# Patient Record
Sex: FEMALE | Race: WHITE | Employment: PART TIME | URBAN - METROPOLITAN AREA
[De-identification: names, ages, dates, MRNs, and addresses within clinical notes are randomized per-mention and may not be internally consistent; named-entity substitution may affect disease eponyms.]

---

## 2017-01-19 PROBLEM — K21.9 GASTROESOPHAGEAL REFLUX DISEASE: Status: ACTIVE | Noted: 2017-01-19

## 2017-06-12 PROBLEM — F33.41 RECURRENT MAJOR DEPRESSIVE DISORDER, IN PARTIAL REMISSION (HCC): Status: ACTIVE | Noted: 2017-06-12

## 2017-06-12 PROBLEM — Z79.890 POSTMENOPAUSAL HRT (HORMONE REPLACEMENT THERAPY): Status: ACTIVE | Noted: 2017-06-12

## 2017-06-23 PROBLEM — M54.42 LOW BACK PAIN WITH LEFT-SIDED SCIATICA: Status: ACTIVE | Noted: 2017-06-23

## 2018-01-18 PROBLEM — G57.12 MERALGIA PARAESTHETICA, LEFT: Status: ACTIVE | Noted: 2017-12-20

## 2018-01-18 PROBLEM — M54.42 LOW BACK PAIN WITH LEFT-SIDED SCIATICA: Status: RESOLVED | Noted: 2017-06-23 | Resolved: 2018-01-18

## 2018-01-18 PROBLEM — R42 DIZZINESS: Status: ACTIVE | Noted: 2018-01-18

## 2018-01-18 PROBLEM — E55.9 VITAMIN D DEFICIENCY: Status: ACTIVE | Noted: 2018-01-18

## 2018-06-26 PROBLEM — R07.9 CHEST PAIN WITH HIGH RISK OF ACUTE CORONARY SYNDROME: Status: ACTIVE | Noted: 2018-06-26

## 2018-06-28 PROBLEM — F41.9 ANXIETY: Status: ACTIVE | Noted: 2018-06-28

## 2018-07-11 PROBLEM — G43.109 COMPLICATED MIGRAINE: Status: ACTIVE | Noted: 2018-07-11

## 2018-11-02 PROBLEM — M54.50 CHRONIC MIDLINE LOW BACK PAIN WITHOUT SCIATICA: Status: ACTIVE | Noted: 2018-11-02

## 2018-11-02 PROBLEM — Z90.721 S/P HYSTERECTOMY WITH OOPHORECTOMY: Status: ACTIVE | Noted: 2018-10-05

## 2018-11-02 PROBLEM — Z23 NEED FOR INFLUENZA VACCINATION: Status: ACTIVE | Noted: 2018-11-02

## 2018-11-02 PROBLEM — Z90.710 S/P HYSTERECTOMY WITH OOPHORECTOMY: Status: ACTIVE | Noted: 2018-10-05

## 2018-11-02 PROBLEM — Z23 NEED FOR PROPHYLACTIC VACCINATION AGAINST STREPTOCOCCUS PNEUMONIAE (PNEUMOCOCCUS): Status: ACTIVE | Noted: 2018-11-02

## 2018-11-02 PROBLEM — G89.29 CHRONIC MIDLINE LOW BACK PAIN WITHOUT SCIATICA: Status: ACTIVE | Noted: 2018-11-02

## 2018-12-02 PROBLEM — Z23 NEED FOR INFLUENZA VACCINATION: Status: RESOLVED | Noted: 2018-11-02 | Resolved: 2018-12-02

## 2020-11-03 PROBLEM — R07.9 CHEST PAIN: Status: RESOLVED | Noted: 2020-11-03 | Resolved: 2020-11-03

## 2021-07-25 PROBLEM — M70.62 TROCHANTERIC BURSITIS OF BOTH HIPS: Status: ACTIVE | Noted: 2021-07-25

## 2021-07-25 PROBLEM — S76.011A: Status: ACTIVE | Noted: 2021-07-25

## 2021-08-17 PROBLEM — R10.31 RIGHT GROIN PAIN: Status: ACTIVE | Noted: 2021-08-17

## 2023-03-08 ENCOUNTER — OFFICE VISIT (OUTPATIENT)
Dept: NEUROLOGY | Age: 53
End: 2023-03-08
Payer: COMMERCIAL

## 2023-03-08 VITALS
HEIGHT: 66 IN | SYSTOLIC BLOOD PRESSURE: 120 MMHG | BODY MASS INDEX: 31.71 KG/M2 | HEART RATE: 72 BPM | DIASTOLIC BLOOD PRESSURE: 70 MMHG | OXYGEN SATURATION: 98 % | WEIGHT: 197.3 LBS

## 2023-03-08 DIAGNOSIS — G57.12 MERALGIA PARESTHETICA OF LEFT SIDE: Primary | ICD-10-CM

## 2023-03-08 PROCEDURE — 1036F TOBACCO NON-USER: CPT | Performed by: PSYCHIATRY & NEUROLOGY

## 2023-03-08 PROCEDURE — G8484 FLU IMMUNIZE NO ADMIN: HCPCS | Performed by: PSYCHIATRY & NEUROLOGY

## 2023-03-08 PROCEDURE — 3017F COLORECTAL CA SCREEN DOC REV: CPT | Performed by: PSYCHIATRY & NEUROLOGY

## 2023-03-08 PROCEDURE — G8427 DOCREV CUR MEDS BY ELIG CLIN: HCPCS | Performed by: PSYCHIATRY & NEUROLOGY

## 2023-03-08 PROCEDURE — 99204 OFFICE O/P NEW MOD 45 MIN: CPT | Performed by: PSYCHIATRY & NEUROLOGY

## 2023-03-08 PROCEDURE — G8417 CALC BMI ABV UP PARAM F/U: HCPCS | Performed by: PSYCHIATRY & NEUROLOGY

## 2023-03-08 NOTE — PROGRESS NOTES
[birth year]    Gloria Yanez  02/3/7286    Chief Complaint   Patient presents with    Peripheral Neuropathy     Pt presents for neuropathic pain, pt states it is present in her left thigh        History of Present Illness  Mahesh Abreu is a 47 y.o. female presenting today for evaluation of:  Left-sided thigh pain    She states she has been having thigh pain off and on for the past couple years. She describes it as aching quality most of the time. She can get a sharp quality to the discomfort as well. She does have a history of scoliosis was causing her some back pain. Denies any shooting pains down the leg. She denies any weakness of the legs. 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 She had to regain the function of her right side and also her speaking ability back. She does still have some degree of a speech impediment. She does take gabapentin 800 mg in the morning, 400 mg in the afternoon, and 400 mg in the evening for the thigh discomfort. It does help. She tells me that she has not had any recent weight gain but in fact has lost weight. She tells me that she was 245 pounds she is now 210 pounds. She had an EMG of her lower extremities in August 2022 which did not reveal any evidence of radiculopathy, peripheral neuropathy, extremity mononeuropathy. Evaluation was suggestive of left-sided meralgia paresthetica which was similar to an EMG performed in 2017.       Subjective    Review of Symptoms:  Neurologic   Symptoms: sensory disturbances, no difficulty with gait or walking, no bowel symptoms, no vertigo, no confusion, no memory loss, no speech disorder, no visual loss, no double vision, no dizziness, no loss of hearing, no weakness, no headaches, no bladder symptoms, no seizures, no excessive fatigue, and no syncope    Current Outpatient Medications   Medication Sig Dispense Refill    ondansetron (ZOFRAN ODT) 4 MG disintegrating tablet Take 1 tablet by mouth every 8 hours as needed for Nausea 15 tablet 0    famotidine (PEPCID) 20 MG tablet TAKE 1 TABLET BY MOUTH TWICE DAILY      ibuprofen (ADVIL;MOTRIN) 600 MG tablet TAKE 1 TABLET BY MOUTH THREE TIMES DAILY AS NEEDED FOR PAIN      lisinopril (PRINIVIL;ZESTRIL) 10 MG tablet TAKE 1 TABLET BY MOUTH EVERY DAY      montelukast (SINGULAIR) 10 MG tablet TAKE 1 TABLET BY MOUTH EVERY DAY IN THE EVENING      oxybutynin (DITROPAN-XL) 10 MG extended release tablet TAKE 1 TABLET BY MOUTH EVERY DAY      pantoprazole (PROTONIX) 40 MG tablet TAKE 1 TABLET BY MOUTH EVERY DAY      tolterodine (DETROL LA) 4 MG extended release capsule TAKE 1 CAPSULE BY MOUTH EVERY DAY      cyclobenzaprine (FLEXERIL) 10 MG tablet Take 1 tablet by mouth 3 times daily as needed for Muscle spasms 12 tablet 0    busPIRone (BUSPAR) 10 MG tablet Take 1 tablet by mouth 2 times daily 60 tablet 5    gabapentin (NEURONTIN) 400 MG capsule TAKE 1 CAPSULE BY MOUTH THREE TIMES DAILY 90 capsule 3    albuterol (PROVENTIL) (2.5 MG/3ML) 0.083% nebulizer solution Take 3 mLs by nebulization every 6 hours as needed for Wheezing 120 each 5    fexofenadine (ALLEGRA) 180 MG tablet Take 1 tablet by mouth daily 90 tablet 3    levothyroxine (SYNTHROID) 75 MCG tablet Take 1 tablet by mouth daily 90 tablet 3    Multiple Vitamins-Minerals (THEREMS-M) TABS Take 1 tablet by mouth daily 30 tablet 5    vitamin D (CHOLECALCIFEROL) 1000 UNIT TABS tablet Take 1 tablet by mouth daily 60 tablet 3    FLOVENT  MCG/ACT inhaler INHALE 2 PUFFS INTO THE LUNGS TWICE DAILY 12 g 2    VENTOLIN  (90 Base) MCG/ACT inhaler INHALE 2 PUFFS INTO THE LUNGS EVERY 4 HOURS AS NEEDED FOR ASTHMA 18 g 3    ALBUTEROL Inhale 0.083 % into the lungs. Use one vial in nebulizer every 8 hours as needed.        atorvastatin (LIPITOR) 10 MG tablet TAKE 1 TABLET BY MOUTH DAILY (Patient not taking: No sig reported) 90 tablet 3    Benzocaine-Menthol (CHLORASEPTIC MAX SORE THROAT) 15-10 MG LOZG Take 1 lozenge by mouth every 2 hours as needed (sore throat) (Patient not taking: No sig reported) 15 lozenge 0    Lidocaine (ALOE VERA BURN RELIEF) 0.5 % AERO Apply 1 Act topically 2 times daily Please disregard above patient signature and apply as directed on the bottle. (Patient not taking: No sig reported) 127 g 0    ranitidine (ZANTAC) 150 MG tablet Take 150 mg by mouth 2 times daily  (Patient not taking: No sig reported)       No current facility-administered medications for this visit. Past Medical History:   Diagnosis Date    Asthma     Depression     GERD (gastroesophageal reflux disease)     H/O chest pain     Secondary to bee sting. H/O Doppler ultrasound 2017    Carotid. Mild (0-49%) disease of bilateral interal carotid artery    H/O echocardiogram 7/6/15    EF 55-60% Normal LV function. Normal echo.      H/O echocardiogram 2017    EF 55-60 %    Headaches, sick     Heart disease     HIGH CHOLESTEROL     History of cardiac monitoring 2017    14 day event - no clinically signifant arrhythmias    Hypothyroidism     Narcolepsy     Postmenopausal HRT (hormone replacement therapy)     Unspecified cerebral artery occlusion with cerebral infarction     At the age 7 (from ASA)       Past Surgical History:   Procedure Laterality Date    APPENDECTOMY      CHOLECYSTECTOMY      FOOT SURGERY      HAND SURGERY  2015    HYSTERECTOMY (CERVIX STATUS UNKNOWN)      OVARY REMOVAL      TONSILLECTOMY      TUBAL LIGATION          Social History     Socioeconomic History    Marital status: Single     Spouse name: None    Number of children: None    Years of education: None    Highest education level: None   Occupational History    Occupation: Disability     Comment: Seizures   Tobacco Use    Smoking status: Former     Packs/day: 2.00     Years: 5.00     Pack years: 10.00     Types: Cigarettes     Quit date: 10/21/1993     Years since quittin.3    Smokeless tobacco: Never   Vaping Use    Vaping Use: Never used Substance and Sexual Activity    Alcohol use: No    Drug use: No    Sexual activity: Yes     Partners: Male     Comment: , 1 child       Family History   Problem Relation Age of Onset    Kidney Disease Mother     Asthma Mother     Migraines Mother     Breast Cancer Mother     Heart Disease Mother     Uterine Cancer Mother     Seizures Brother     Heart Failure Father     Heart Disease Father     Rheum Arthritis Sister     High Cholesterol Sister     Hypertension Sister     Diabetes Maternal Uncle     High Blood Pressure Maternal Grandmother     High Blood Pressure Maternal Grandfather     Diabetes Maternal Grandfather     High Blood Pressure Paternal Grandmother     Breast Cancer Maternal Aunt     Cancer Paternal Uncle     Ovarian Cancer Neg Hx        Objective    Physical Exam:    Constitutional   Weight: obese  Heart/Vascular   Rate and Rhythm: RRR   Murmurs: none   Arterial Pulses:  no carotid bruits  Neck   Appearance/Palpation/Auscultation: supple  Mental Status   Orientation: oriented to person, oriented to place, oriented to problem, and oriented to time   Mood/Affect: appropriate mood and appropriate affect   Memory/Other: recent memory intact, remote memory intact, fund of knowledge intact, attention span normal, and concentration normal  Language   Language: dysarthria mild, (normal) language, and no dysphasia/aphasia  Cranial Nerves   CN II Right: visual fields appear intact   CN II Left: visual fields appear intact   CN III, IV, VI: EOM no nystagmus, normal pursuit, and extraocular muscle strength normal   CN III: pupil normal size, pupil reactive to light and dark, pupil accomodates, and no ptosis   CN IV: normal   CN VI: normal   CN V Right: normal sensation and muscles of mastication intact   CN V Left: normal sensation and muscles of mastication intact   CN VII Right: normal facial expression   CN VII Left: normal facial expression   CN VIII Right: hearing in tact to normal conversation   CN VIII Left: hearing in tact to normal conversation   CN IX,X: normal palatal movement   CN XI Right: normal sternocleidomastoid and normal trapezius   CN XI Left: normal sternocleidomastoid and normal trapezius   CN XII: no tremors of the tongue, no fasciculation of the tongue, tongue protrudes midline, normal power to left, and normal power to right  Gait and Stance   Gait/Posture: station normal, ambulates independently, gait normal, and Romberg's test normal  Motor/Coordination Exam   General: no bradykinesia, no tremors, no chorea, no athetosis, no myoclonus, and no dyskinesia   Right Upper Extremity: normal motor strength, normal bulk, and normal tone   Left Upper Extremity: normal motor strength, normal bulk, and normal tone   Right Lower Extremity: normal motor strength, normal bulk, and normal tone   Left Lower Extremity: normal motor strength, normal bulk, and normal tone   Coordination: no drift, normal finger-to-nose, and rapid alternating movements normal  Reflexes   Reflexes Right: DTRS are normal throughout   Reflexes Left: DTRS are normal throughout   Plantar Reflex Right: response downgoing   Plantar Reflex Left: response downgoing   Hoffmans Reflex Right: absent   Hoffmans Reflex Left: absent  Sensory   Sensation: normal light touch, normal pinprick, normal temperature, normal vibration, normal position, normal DSS, and no neglect  Spine   Cervical Spine: no tenderness, no dystonia , and full ROM   Thoracic Spine: no spasms, no bony abnormalities, normal curvature, no tenderness, and full ROM   Low Back: full ROM, no pain, no spasms, and no bony abnormalities  Lungs   Auscultation: normal breath sounds  Skin   Inspection: no jaundice, no lesions, no rashes, and no cyanosis    Pain of the left thigh with flexion of the left hip      /70 (Site: Left Upper Arm, Position: Sitting, Cuff Size: Large Adult)   Pulse 72   Ht 5' 5.5\" (1.664 m)   Wt 197 lb 4.8 oz (89.5 kg)   SpO2 98%   BMI 32.33 kg/m²     Assessment and Plan     Diagnosis Orders   1. Meralgia paresthetica of left side            Dona's exam and most recent EMG is suggestive of left-sided meralgia paresthetica. Meralgia paresthetica is a lateral femoral cutaneous neuropathy which causes pain and sensory disturbance over the anterior and lateral thigh. There is no evidence of radiculopathy or other extremity mononeuropathy. Wearing loose fitting clothing and weight loss typically improves meralgia paresthetica. She has been losing weight. She will continue on gabapentin 800 mg in the morning, 400 mg in the afternoon, and 400 at bedtime for the nerve discomfort. She feels that this is an adequate dose at this time. We will not make any changes. Do not see any indication for further neurodiagnostics. I will have Langston follow-up on an as-needed basis. She will call with any questions or concerns. Thank you for allowing us to participate in the care of your patient. Please call with any questions. Return if symptoms worsen or fail to improve.     Lou Rogers, DO

## 2023-03-21 ENCOUNTER — OFFICE VISIT (OUTPATIENT)
Dept: CARDIOLOGY CLINIC | Age: 53
End: 2023-03-21
Payer: COMMERCIAL

## 2023-03-21 VITALS
DIASTOLIC BLOOD PRESSURE: 70 MMHG | HEIGHT: 65 IN | SYSTOLIC BLOOD PRESSURE: 126 MMHG | BODY MASS INDEX: 35.32 KG/M2 | HEART RATE: 79 BPM | WEIGHT: 212 LBS

## 2023-03-21 DIAGNOSIS — M54.50 CHRONIC MIDLINE LOW BACK PAIN WITHOUT SCIATICA: Primary | ICD-10-CM

## 2023-03-21 DIAGNOSIS — G89.29 CHRONIC MIDLINE LOW BACK PAIN WITHOUT SCIATICA: Primary | ICD-10-CM

## 2023-03-21 DIAGNOSIS — R06.02 SOB (SHORTNESS OF BREATH): ICD-10-CM

## 2023-03-21 PROCEDURE — G8484 FLU IMMUNIZE NO ADMIN: HCPCS | Performed by: INTERNAL MEDICINE

## 2023-03-21 PROCEDURE — 1036F TOBACCO NON-USER: CPT | Performed by: INTERNAL MEDICINE

## 2023-03-21 PROCEDURE — 93000 ELECTROCARDIOGRAM COMPLETE: CPT | Performed by: INTERNAL MEDICINE

## 2023-03-21 PROCEDURE — G8427 DOCREV CUR MEDS BY ELIG CLIN: HCPCS | Performed by: INTERNAL MEDICINE

## 2023-03-21 PROCEDURE — G8417 CALC BMI ABV UP PARAM F/U: HCPCS | Performed by: INTERNAL MEDICINE

## 2023-03-21 PROCEDURE — 99214 OFFICE O/P EST MOD 30 MIN: CPT | Performed by: INTERNAL MEDICINE

## 2023-03-21 PROCEDURE — 3017F COLORECTAL CA SCREEN DOC REV: CPT | Performed by: INTERNAL MEDICINE

## 2023-03-21 NOTE — PATIENT INSTRUCTIONS
**It is YOUR responsibilty to bring medication bottles and/or updated medication list to [address] [place] [address]. This will allow us to better serve you and all your healthcare needs**    [facility] Locations - No appointment necessary. Sites open Monday to Friday. Call your preferred location for test preparation, business   hours and other information you need. [employer] accepts [employer]. [address] Venus L[place], [address] [facility]  Phone: [phone] [facility]  [address]  [address] [facility], [address] [facility]  Phone: [phone]       Thank you for allowing us to care for you today! We want to ensure we can follow your treatment plan and we strive to give you the best outcomes and experience possible. If you ever have a life threatening emergency and call 911 - for an ambulance (EMS)   Our providers can only care for you at:   [facility] or [facility]. Even if you have someone take you or you drive yourself we can only care for you in a [facility] facility. Our providers are not setup at the other healthcare locations! Please be informed that if you contact our office outside of normal business hours the physician on call cannot help with any scheduling or rescheduling issues, procedure instruction questions or any type of medication issue. We advise you for any urgent/emergency that you go to the nearest emergency room!     PLEASE CALL OUR OFFICE DURING NORMAL BUSINESS HOURS    Monday - Friday   8 am to 5 pm    [facility] [address]: [phone]    [place]:  [phone]

## 2023-03-21 NOTE — PROGRESS NOTES
Normal heart rate, Normal rhythm, No murmurs, No rubs, No gallops. Carotid arteries pulse and amplitude are normal no bruit, no abdominal bruit noted ( normal abdominal aorta ausculation),   Extremities - No cyanosis, clubbing, or significant edema, no varicose veins    Abdomen - No masses, tenderness, or organomegaly, no hepato-splenomegally, no bruits  Musculoskeletal - No significant edema, no kyphosis or scoliosis, no deformity in any extremity noted, muscle strength and tone are normal  Skin: no ulcer,no scar,no stasis dermatitis   Neurologic - alert oriented times 3,Cranial nerves II through XII are grossly intact. There were no gross focal neurologic abnormalities. Psychiatric: normal mood and affect    Lab Results   Component Value Date/Time    TROPONINI 0.007 04/23/2014 08:18 PM     BNP:  No results found for: BNP  PT/INR:  No results found for: Nidmi  Lab Results   Component Value Date    LABA1C 5.5 12/23/2010     Lab Results   Component Value Date    CHOL 186 02/26/2022    TRIG 95 02/26/2022    HDL 58 02/26/2022    LDLCALC 109 (H) 02/26/2022    LDLDIRECT 78 07/15/2020     Lab Results   Component Value Date    ALT 22 02/26/2022    AST 20 02/26/2022     TSH:    Lab Results   Component Value Date/Time    TSH 1.39 09/08/2016 12:23 PM     Ekg: nsr, PVC  Impression:  Miah Welch is a 47 y. o.year old who  has a past medical history of Asthma, Depression, GERD (gastroesophageal reflux disease), H/O chest pain, H/O Doppler ultrasound, H/O echocardiogram, H/O echocardiogram, Headaches, sick, Heart disease, HIGH CHOLESTEROL, History of cardiac monitoring, Hypothyroidism, Narcolepsy, Postmenopausal HRT (hormone replacement therapy), and Unspecified cerebral artery occlusion with cerebral infarction.  and presents with     Plan:  'pvcs: REGULAR treadmilll stress test and echo ordered  Narcolepsy: stable  HTN: stable, off lisinopril  Hypothyroidsim: stable, she restarted synthroid  GERD: stable, off protonix  Dyslpidemia:

## 2023-06-19 ENCOUNTER — HOSPITAL ENCOUNTER (OUTPATIENT)
Dept: PHYSICAL THERAPY | Age: 53
Setting detail: THERAPIES SERIES
Discharge: HOME | End: 2023-06-19
Payer: COMMERCIAL

## 2023-06-19 PROCEDURE — 97161 PT EVAL LOW COMPLEX 20 MIN: CPT

## 2023-06-19 PROCEDURE — 97110 THERAPEUTIC EXERCISES: CPT

## 2023-06-19 NOTE — FLOWSHEET NOTE
Outpatient Physical Therapy  [place]           [x] Phone: [phone]   Fax: [phone]  [facility]           [] Phone: [phone]   Fax: [phone]        Physical Therapy Daily Treatment Note  Date:  2023    Patient Name:  Luana Richards    :  1970  MRN: [id number]  Restrictions/Precautions: Restrictions/Precautions: None        Diagnosis:   Occipital neuralgia [M54.81]    Date of Injury/Surgery:   Treatment Diagnosis:  suboccipital musculature tightness  Insurance/Certification information: [employer]- precert required  Referring Physician:  Tom Jewell MD     PCP: Kim Back, DO  Next Doctor Visit:    Plan of care signed (Y/N):  sent   Outcome Measure: NDI:   Visit# / total visits:    Pain level: 0/10   Goals:     Patient goals: reduce pressure in back of head  Short term goals  Time Frame for Short term goals: refer to LT    Long Term Goals  Time Frame for Long Term Goals: 6 visits  Pt will report overall improvement in condition by 50% or more  pt will report a reduction in pressure of suboccipitals by 50% or more  pt will improve cervical extension AROM to 40 deg without an increase in pain/pressure for looking up into cabinets  pt will improve cervical RSB AROM to 30 deg or more for improved work duties         Summary of Evaluation:  Assessment: pt is a 46 yo female that presents to therapy with complaints of suboccipital pressure lasting 15+ years. pt denies any pain, n/t, headaches, just notes there is pressure in the back of her head. pt demo impaired BUE strength, cervical ROM and activity tolerance. pt is not able to lie supine due to increased pressure on the back of her head. Pt would benefit from continued skilled physical therapy to address impairments and limitations, progress toward goal completion, promote independence with ADLs, and prevent further injury. Subjective:  See eval         Any changes in Ambulatory Summary Sheet?

## 2023-06-19 NOTE — PLAN OF CARE
Outpatient Physical Therapy           [place]           [x] Phone: [phone]   Fax: [phone]  [place] [facility]           [] Phone: [phone]   Fax: [phone]     To: Rupinder Metz MD     From: Miguel Sales, PT, DPT     Patient: Bernadette Sullivan       : 1970  Diagnosis: Occipital neuralgia [M54.81]    Treatment Diagnosis: suboccipital musculature tightness  Date: 2023    Physical Therapy Certification/Re-Certification Form  Dear Dr. Norma Montanez,   The following patient has been evaluated for physical therapy services and for therapy to continue, insurance requires physician review of the treatment plan initially and every 90 days. Please review the attached evaluation and/or summary of the patient's plan of care, and verify that you agree therapy should continue by signing the attached document and sending it back to our office. Assessment:    Assessment: pt is a 48 yo female that presents to therapy with complaints of suboccipital pressure lasting 15+ years. pt denies any pain, n/t, headaches, just notes there is pressure in the back of her head. pt demo impaired BUE strength, cervical ROM and activity tolerance. pt is not able to lie supine due to increased pressure on the back of her head. Pt would benefit from continued skilled physical therapy to address impairments and limitations, progress toward goal completion, promote independence with ADLs, and prevent further injury. Patient agrees with established plan of care and assisted in the development of their short term and long term goals. Patient had no adverse reaction with initial treatment and there are no barriers to learning. Learning preferences include demonstration, practice, and handouts. Patient expressed understanding of HEP and appears to be motivated to participate in an active PT program including compliance with HEP expectations.         Plan of Care/Treatment to date:  [x] Therapeutic Exercise  [x] Modalities:  [x]

## 2023-06-24 ENCOUNTER — HOSPITAL ENCOUNTER (OUTPATIENT)
Dept: PHYSICAL THERAPY | Age: 53
Setting detail: THERAPIES SERIES
Discharge: HOME | End: 2023-06-24
Payer: COMMERCIAL

## 2023-06-24 NOTE — FLOWSHEET NOTE
Physical Therapy  Cancellation/No-show Note  Patient Name:  Reece Brittle  :  1970   Date:  2023  Cancelled visits to date: 1  No-shows to date: 0    For today's appointment patient:  [x]  Cancelled  []  Rescheduled appointment  []  No-show     Reason given by patient:  []  Patient ill  []  Conflicting appointment  []  No transportation    []  Conflict with work  [x]  No reason given  []  Other:     Comments:  Pt left voice mail that she had to cancel. Electronically signed by:   Eddy Carlin PTA

## 2023-07-01 ENCOUNTER — HOSPITAL ENCOUNTER (OUTPATIENT)
Dept: PHYSICAL THERAPY | Age: 53
Setting detail: THERAPIES SERIES
Discharge: HOME | End: 2023-07-01

## 2023-07-08 ENCOUNTER — HOSPITAL ENCOUNTER (OUTPATIENT)
Dept: PHYSICAL THERAPY | Age: 53
Setting detail: THERAPIES SERIES
Discharge: HOME | End: 2023-07-08

## 2023-07-08 NOTE — FLOWSHEET NOTE
head. Pt would benefit from continued skilled physical therapy to address impairments and limitations, progress toward goal completion, promote independence with ADLs, and prevent further injury. End pain: 0/10      Plan for Next Session:   Specific Instructions for Next Treatment: chin tucks, SOR/STM cervical paraspinals/suboccipitals, cervical ROM    Time In / Time Out:    1315/1345        Timed Code/Total Treatment Minutes:  30'/30'  1 TE, 1 Man tx. Next Progress Note due:  5th visit       Plan of Care Interventions:  [x] Therapeutic Exercise  [x] Modalities:  [x] Therapeutic Activity     [] Ultrasound  [] Estim  [] Gait Training      [] Cervical Traction [] Lumbar Traction  [x] Neuromuscular Re-education    [x] Cold/hotpack [] Iontophoresis   [x] Instruction in HEP      [] Vasopneumatic   [x] Dry Needling    [x] Manual Therapy               [] Aquatic Therapy              Electronically signed by:   Pradeep Barahona, JUDE  7/8/2023, 1:13 PM

## 2023-07-22 ENCOUNTER — HOSPITAL ENCOUNTER (OUTPATIENT)
Dept: PHYSICAL THERAPY | Age: 53
Setting detail: THERAPIES SERIES
Discharge: HOME | End: 2023-07-22
Payer: COMMERCIAL

## 2023-07-22 NOTE — FLOWSHEET NOTE
Physical Therapy  Cancellation/No-show Note  Patient Name:  Abdirizak Juarez  :  1970   Date:  2023  Cancelled visits to date: 1  No-shows to date: 3    For today's appointment patient:  []  Cancelled  []  Rescheduled appointment  [x]  No-show     Reason given by patient:  []  Patient ill  []  Conflicting appointment  []  No transportation    []  Conflict with work  [x]  No reason given  []  Other:     Comments:      Electronically signed by:  Hai Carter II, PTA [id number]         2023 1:20 PM

## 2023-07-29 ENCOUNTER — HOSPITAL ENCOUNTER (OUTPATIENT)
Dept: PHYSICAL THERAPY | Age: 53
Setting detail: THERAPIES SERIES
Discharge: HOME | End: 2023-07-29
Payer: COMMERCIAL

## 2023-07-29 PROCEDURE — 97140 MANUAL THERAPY 1/> REGIONS: CPT

## 2023-07-29 PROCEDURE — 97110 THERAPEUTIC EXERCISES: CPT

## 2023-07-29 NOTE — PROGRESS NOTES
Outpatient Physical Therapy           [place]           [x] Phone: [phone]   Fax: [phone]  [facility]           [] Phone: [phone]   Fax: [phone]      To: Óscar Morales MD     From: Magdalena Alejandro, PT, DPT     Patient: John Luz                : 1970  Diagnosis:  Occipital neuralgia [M54.81]        Treatment Diagnosis:    Óscar Morales MD      Date: 2023  [x]  Progress Note                []  Discharge Note    Evaluation Date:  23  Total Visits to date:   3 Cancels/No-shows to date:  4    Subjective:    Pt states that overall she feels about 70% better than before including pressure. However when asked about if her pressure or discomfort is better, she states it is about the same. She states she is doing her HEP but sometimes doesn't get it done because of work. Plan of Care/Treatment to date:  [x] Therapeutic Exercise    [] Modalities:  [x] Therapeutic Activity     [] Ultrasound  [] Electrical Stimulation  [x] Gait Training      [] Cervical Traction   [] Lumbar Traction  [x] Neuromuscular Re-education  [] Cold/hotpack [] Iontophoresis  [x] Instruction in HEP      Other:  [x] Manual Therapy       []  Vasopneumatic  [] Aquatic Therapy       []   Dry Needle Therapy                      Objective/Significant Findings At Last Visit/Comments:    R SB AROM C spine: 29 deg  C spine ext AROM: 33 deg    Assessment:     Pt has shown slight improvement since therapy start with C spine AROM and reduced tension. She is still having pain but has missed multiple apt so this may contribute to reduced progress thus far in sessions. Pt would continue to benefit from skilled therapy interventions to address remaining impairments, improve mobility and strength and progress toward goal completion while reducing risk for re-injury or further decline.     Goal Status:  [] Achieved [x] Partially Achieved  [] Not Achieved   Patient goals: reduce pressure in back of head  Short term

## 2023-07-29 NOTE — FLOWSHEET NOTE
kirit       Outpatient Physical Therapy  [place]           [x] Phone: [phone]   Fax: [phone]  Álvaro Escobar           [] Phone: [phone]   Fax: [phone]        Physical Therapy Daily Treatment Note  Date:  2023    Patient Name:  Ken Hicks    :  1970  MRN: [id number]  Restrictions/Precautions: Restrictions/Precautions: None  Diagnosis:   Occipital neuralgia [M54.81]    Date of Injury/Surgery:   Treatment Diagnosis:  suboccipital musculature tightness  Insurance/Certification information: [employer]-  12 visits through   Referring Physician:  Junior Montgomery MD     Next Doctor Visit:    Plan of care signed (Y/N):  sent   Outcome Measure: NDI:   Visit# / total visits:  3 /  Pain level: 0/10 No pain just pressure  Goals:     Patient goals: reduce pressure in back of head  Short term goals  Time Frame for Short term goals: refer to [facility]    Long Term Goals  Time Frame for Long Term Goals: 6 visits  Pt will report overall improvement in condition by 50% or more: MET   pt will report a reduction in pressure of suboccipitals by 50% or more: MET   pt will improve cervical extension AROM to 40 deg without an increase in pain/pressure for looking up into cabinets: Progressing   pt will improve cervical RSB AROM to 30 deg or more for improved work duties: Almost MET        Summary of Evaluation:  Assessment: pt is a 46 yo female that presents to therapy with complaints of suboccipital pressure lasting 15+ years. pt denies any pain, n/t, headaches, just notes there is pressure in the back of her head. pt demo impaired BUE strength, cervical ROM and activity tolerance. pt is not able to lie supine due to increased pressure on the back of her head. Pt would benefit from continued skilled physical therapy to address impairments and limitations, progress toward goal completion, promote independence with ADLs, and prevent further injury.       Subjective:  Pt states that

## 2023-08-05 ENCOUNTER — HOSPITAL ENCOUNTER (OUTPATIENT)
Dept: PHYSICAL THERAPY | Age: 53
Setting detail: THERAPIES SERIES
Discharge: HOME | End: 2023-08-05
Payer: COMMERCIAL

## 2023-08-05 PROCEDURE — 97110 THERAPEUTIC EXERCISES: CPT

## 2023-08-05 PROCEDURE — 97140 MANUAL THERAPY 1/> REGIONS: CPT

## 2023-08-05 NOTE — FLOWSHEET NOTE
kirit       Outpatient Physical Therapy  [place]           [x] Phone: [phone]   Fax: [phone]  Angela Forte           [] Phone: [phone]   Fax: [phone]        Physical Therapy Daily Treatment Note  Date:  2023    Patient Name:  Bonna Schlatter    :  1970  MRN: [id number]  Restrictions/Precautions: Restrictions/Precautions: None  Diagnosis:   Occipital neuralgia [M54.81]    Date of Injury/Surgery:   Treatment Diagnosis:  suboccipital musculature tightness  Insurance/Certification information: [employer]-  12 visits through   Referring Physician:  Alli Storm MD     Next Doctor Visit:    Plan of care signed (Y/N):  sent   Outcome Measure: NDI:   Visit# / total visits:    Pain level: 10/10 pressure    Goals:     Patient goals: reduce pressure in back of head  Short term goals  Time Frame for Short term goals: refer to [facility]    Long Term Goals  Time Frame for Long Term Goals: 6 visits  Pt will report overall improvement in condition by 50% or more: MET   pt will report a reduction in pressure of suboccipitals by 50% or more: MET   pt will improve cervical extension AROM to 40 deg without an increase in pain/pressure for looking up into cabinets: Progressing   pt will improve cervical RSB AROM to 30 deg or more for improved work duties: Almost MET        Summary of Evaluation:  Assessment: pt is a 48 yo female that presents to therapy with complaints of suboccipital pressure lasting 15+ years. pt denies any pain, n/t, headaches, just notes there is pressure in the back of her head. pt demo impaired BUE strength, cervical ROM and activity tolerance. pt is not able to lie supine due to increased pressure on the back of her head. Pt would benefit from continued skilled physical therapy to address impairments and limitations, progress toward goal completion, promote independence with ADLs, and prevent further injury.       Subjective:  Pt stated that pressure was

## 2023-08-14 ENCOUNTER — HOSPITAL ENCOUNTER (OUTPATIENT)
Dept: PHYSICAL THERAPY | Age: 53
Setting detail: THERAPIES SERIES
Discharge: HOME | End: 2023-08-14
Payer: COMMERCIAL

## 2023-08-14 PROCEDURE — 97140 MANUAL THERAPY 1/> REGIONS: CPT

## 2023-08-14 NOTE — FLOWSHEET NOTE
to the doctor on the 22nd. She feels that her pressure has shifted from the back of her head more to the right side. She notes no pain just pressure. Any changes in Ambulatory Summary Sheet? None      Objective:    Cervical AROM   Extension: 45 deg  RSB: 34 deg      Exercises: (No more than 4 columns)   Exercise/Equipment Date 8/5/2023 #4 8/14/23 #5           WARM UP      UBE   2'/2' 2'/2'          TABLE      Sitting chin tucks (cant lie supine) X15, 3\" ea supine (after manual)  10 x 2 3\" in supine  after manual    UT/LS 2X30\" ea side ea man    Scap squeezes X12, 3\" Seated 10 x 2 5\"     Cervical SNAGS               STANDING      Mid rows/ext  X10 YTB  YTB 10 x 2 ea dir                                             PROPRIOCEPTION                                    MODALITIES                      Other Therapeutic Activities/Education:  HEP importance    Home Exercise Program:  Issued, practiced and pt demo ability to perform on eval date  6/19: UT/LS, scap squeezes, chin tucks    Manual Treatments: SOR, STM UT/LS/scalenes bilat    Modalities:  none    Communication with other providers:  DC sent     Assessment:  Pt has shown good progress since therapy start regarding improved cervical ROM, activity tolerance, functional mobility and pain. Pt has met all of her goals at this time and is no longer having any major limitations outside of therapy. PT educated pt on continuation of HEP after discharge for max benefits and reduced risk for future decline. Pt discharged this date.  end pain: 0/10- just pressure      Time In / Time Out: 1345/1408    Timed Code/Total Treatment Minutes:  23/23: 2 man     Next Progress Note due:  5th visit     Plan of Care Interventions:  [x] Therapeutic Exercise  [x] Modalities:  [x] Therapeutic Activity     [] Ultrasound  [] Estim  [] Gait Training      [] Cervical Traction [] Lumbar Traction  [x] Neuromuscular Re-education    [x] Cold/hotpack [] Iontophoresis   [x] Instruction in HEP

## 2023-09-12 ENCOUNTER — HOSPITAL ENCOUNTER (OUTPATIENT)
Dept: PHYSICAL THERAPY | Age: 53
Setting detail: THERAPIES SERIES
Discharge: HOME | End: 2023-09-12
Payer: COMMERCIAL

## 2023-09-12 PROCEDURE — 97110 THERAPEUTIC EXERCISES: CPT

## 2023-09-12 PROCEDURE — 97140 MANUAL THERAPY 1/> REGIONS: CPT

## 2023-09-12 PROCEDURE — 97161 PT EVAL LOW COMPLEX 20 MIN: CPT

## 2023-09-12 NOTE — PROGRESS NOTES
Outpatient Physical Therapy           [place]           [x] Phone: [phone]   Fax: [phone]  [facility]           [] Phone: [phone]   Fax: [phone]      To: MD Dr. Polo May   From: Dylan Bermudez, PT, DPT     Patient: Jozef Jennings                    : 1970  Diagnosis:  Occipital neuralgia [M54.81]  Diagnosis: neck and shoulder pain     Treatment Diagnosis:  suboccipital musculature tightness     Date: 2023  [x]  Progress Note                []  Discharge Note    Evaluation Date:  23   Total Visits to date: 1  Cancels/No-shows to date:  0    Subjective:  Ebony Coronado reports the therapy previously helped the pressure in her head. States it is the same thing happening now. She has continued to do the exercises at home with good relief. No numbness tingling. States she slipped 15 years ago on ice and hit her head on black ice. Muscle relaxer's and therapy make it better. She did receive injections on  with some relief. No weakness in her arms. Plan of Care/Treatment to date:  [x] Therapeutic Exercise    [x] Modalities:  [x] Therapeutic Activity     [] Ultrasound  [] Electrical Stimulation  [] Gait Training      [] Cervical Traction   [] Lumbar Traction  [x] Neuromuscular Re-education  [x] Cold/hotpack [] Iontophoresis  [x] Instruction in HEP      Other:  [x] Manual Therapy       []  Vasopneumatic  [] Aquatic Therapy       [x]   Dry Needle Therapy                      Objective/Significant Findings At Last Visit/Comments:      Cervical ROM:  Flexion: 37 deg  Extension: 35 deg  R rotation: 45 deg  L rotation: 52 deg  R side bending: 15 deg  L side bendin deg     LUE Strength: WNL except 4/5 shoulder flexion, ER  RUE Strength:  WNL 4/5 shoulder flexion, ER    Cervical joint mobilizations:  increased pain upper cervical lateral/down glides     Negative VBI    Thoracic mobility: WNL, no increased pain    Palpation: tenderness along rhomboids, UT, mid trap

## 2023-09-12 NOTE — FLOWSHEET NOTE
WARM UP      UBE   2'/2' 2'/2'          TABLE      Sitting chin tucks (cant lie supine) X15, 3\" ea supine (after manual)  10 x 2 3\" in supine  after manual REVIEWED   UT/LS 2X30\" ea side ea man reviewed   Scap squeezes X12, 3\" Seated 10 x 2 5\"  reviewed   Cervical SNAGS      Suboccipital stretch    HEP reviewed         STANDING      Mid rows/ext  X10 YTB  YTB 10 x 2 ea dir reviewed                                            PROPRIOCEPTION                                    MODALITIES                      Other Therapeutic Activities/Education:  HEP importance    Home Exercise Program:  Issued, practiced and pt demo ability to perform on eval date  6/19: UT/LS, scap squeezes, chin tucks    Manual Treatments: SOR, STM UT/LS/scalenes bilat x10'    Modalities:  none    Communication with other providers:  DC sent     Assessment:  Patient is a 46 yo female that presents to therapy with complaints of suboccipital pressure lasting 15+ years. She continues with significant tenderness along suboccipital and upper/mid trap regions. Provided updated handout for continued home exercise program. Did respond well to manual intervention in today's session, difficulty with laying head flat on pillow due to pressure in the head. Pt will benefit from PT intervention to address remaining deficits listed above.        Time In / Time Out: 0047-9898    Timed Code/Total Treatment Minutes:  (1) PT eval  (1) TE  (1) MAN    Next Progress Note due:  5th visit     Plan of Care Interventions:  [x] Therapeutic Exercise  [x] Modalities:  [x] Therapeutic Activity     [] Ultrasound  [] Estim  [] Gait Training      [] Cervical Traction [] Lumbar Traction  [x] Neuromuscular Re-education    [x] Cold/hotpack [] Iontophoresis   [x] Instruction in HEP      [] Vasopneumatic   [x] Dry Needling    [x] Manual Therapy               [] Aquatic Therapy              Electronically signed by:  Dylan Bermudez, PT, DPT     9/12/2023,4:30 PM

## 2023-09-21 ENCOUNTER — HOSPITAL ENCOUNTER (OUTPATIENT)
Dept: PHYSICAL THERAPY | Age: 53
Setting detail: THERAPIES SERIES
Discharge: HOME | End: 2023-09-21
Payer: COMMERCIAL

## 2023-09-21 PROCEDURE — 97110 THERAPEUTIC EXERCISES: CPT

## 2023-09-21 PROCEDURE — 97140 MANUAL THERAPY 1/> REGIONS: CPT

## 2023-09-21 NOTE — FLOWSHEET NOTE
Outpatient Physical Therapy  [place]           [x] Phone: [phone]   Fax: [phone]  Sonam Resendez           [] Phone: [phone]   Fax: [phone]        Physical Therapy Daily Treatment Note  Date:  2023    Patient Name:  Lianne Garza    :  1970  MRN: [id number]  Restrictions/Precautions: Restrictions/Precautions: None  Diagnosis:   Occipital neuralgia [M54.81]    Date of Injury/Surgery:   Treatment Diagnosis:  suboccipital musculature tightness  Insurance/Certification information: [facility]-  12 visits  Referring Physician:  Philip Holder MD     Next Doctor Visit:    Plan of care signed (Y/N):  yes  Outcome Measure: NDI:   Visit# / total visits:  6/  Pain level:  0/10    Goals:     Patient goals: reduce pressure in back of head   Short term goals  Time Frame for Short term goals: refer to [facility]  Long Term Goals  Time Frame for Long Term Goals: 6 visits  Pt will report overall improvement in condition by 50% or more:   pt will report a reduction in pressure of suboccipitals by 50% or more:   pt will improve cervical extension AROM to 40 deg without an increase in pain/pressure for looking up into cabinets   pt will improve cervical RSB AROM to 30 deg or more for improved work duties     Summary of Evaluation: Patient is a 46 yo female that presents to therapy with complaints of suboccipital pressure lasting 15+ years. She continues with significant tenderness along suboccipital and upper/mid trap regions. Provided updated handout for continued home exercise program. Did respond well to manual intervention in today's session, difficulty with laying head flat on pillow due to pressure in the head. Pt will benefit from PT intervention to address remaining deficits listed above. Subjective: Pt stated that she is not having any pain just pressure in  middle of her skull. Pt reported that pressure at 9/10 today. Any changes in Ambulatory Summary Sheet?   None      Objective:

## 2023-09-26 ENCOUNTER — HOSPITAL ENCOUNTER (OUTPATIENT)
Dept: PHYSICAL THERAPY | Age: 53
Setting detail: THERAPIES SERIES
Discharge: HOME | End: 2023-09-26
Payer: COMMERCIAL

## 2023-09-26 PROCEDURE — 97140 MANUAL THERAPY 1/> REGIONS: CPT

## 2023-09-26 PROCEDURE — 97110 THERAPEUTIC EXERCISES: CPT

## 2023-09-26 NOTE — FLOWSHEET NOTE
Outpatient Physical Therapy  [place]           [x] Phone: [phone]   Fax: [phone]  [facility]           [] Phone: [phone]   Fax: [phone]        Physical Therapy Daily Treatment Note  Date:  2023    Patient Name:  Lesvia Abbasi    :  1970  MRN: [id number]  Restrictions/Precautions: Restrictions/Precautions: None  Diagnosis:   Occipital neuralgia [M54.81]    Date of Injury/Surgery:   Treatment Diagnosis:  suboccipital musculature tightness  Insurance/Certification information: [facility]-  12 visits  Referring Physician:  Coby Weiss MD     Next Doctor Visit:    Plan of care signed (Y/N):  yes  Outcome Measure: NDI:   Visit# / total visits:  3/10  Pain level:  0/10  Goals:     Patient goals: reduce pressure in back of head   Short term goals  Time Frame for Short term goals: refer to [facility]  Long Term Goals  Time Frame for Long Term Goals: 6 visits  Pt will report overall improvement in condition by 50% or more:   pt will report a reduction in pressure of suboccipitals by 50% or more:   pt will improve cervical extension AROM to 40 deg without an increase in pain/pressure for looking up into cabinets   pt will improve cervical RSB AROM to 30 deg or more for improved work duties     Summary of Evaluation: Patient is a 48 yo female that presents to therapy with complaints of suboccipital pressure lasting 15+ years. She continues with significant tenderness along suboccipital and upper/mid trap regions. Provided updated handout for continued home exercise program. Did respond well to manual intervention in today's session, difficulty with laying head flat on pillow due to pressure in the head. Pt will benefit from PT intervention to address remaining deficits listed above. Subjective: Pt states she is a 8-8.5/10 pressure in the head. She is using the muscle relaxers. Considering getting in to the doctor to get more injections.  Her symptom relief lasted until she went to

## 2023-09-28 ENCOUNTER — HOSPITAL ENCOUNTER (OUTPATIENT)
Dept: PHYSICAL THERAPY | Age: 53
Discharge: HOME | End: 2023-09-28

## 2023-09-28 NOTE — FLOWSHEET NOTE
Physical Therapy  Cancellation/No-show Note  Patient Name:  John Luz  :  1970   Date:  2023  Cancelled visits to date: 1  No-shows to date: 1    For today's appointment patient:  [x]  Cancelled  []  Rescheduled appointment  []  No-show     Reason given by patient:  []  Patient ill  []  Conflicting appointment  []  No transportation    []  Conflict with work  []  No reason given  []  Other:     Comments:  sick     Electronically signed by:  Navid Arroyo PTA      2023,12:44 PM

## 2023-09-30 ENCOUNTER — HOSPITAL ENCOUNTER (OUTPATIENT)
Dept: PHYSICAL THERAPY | Age: 53
Setting detail: THERAPIES SERIES
Discharge: HOME | End: 2023-09-30
Payer: COMMERCIAL

## 2023-09-30 NOTE — FLOWSHEET NOTE
Physical Therapy  Cancellation/No-show Note  Patient Name:  Cecy Keating  :  1970   Date:  2023  Cancelled visits to date: 1  No-shows to date: 2    For today's appointment patient:  []  Cancelled  []  Rescheduled appointment  [x]  No-show     Reason given by patient:  []  Patient ill  []  Conflicting appointment  []  No transportation    []  Conflict with work  [x]  No reason given  []  Other:     Comments:     Electronically signed by:  Connie Jaffe, JUDE [id number]            2023,1:30 PM

## 2023-10-03 ENCOUNTER — HOSPITAL ENCOUNTER (OUTPATIENT)
Dept: PHYSICAL THERAPY | Age: 53
Setting detail: THERAPIES SERIES
Discharge: HOME | End: 2023-10-03
Payer: COMMERCIAL

## 2023-10-03 PROCEDURE — 97140 MANUAL THERAPY 1/> REGIONS: CPT

## 2023-10-03 PROCEDURE — 97110 THERAPEUTIC EXERCISES: CPT

## 2023-10-03 NOTE — FLOWSHEET NOTE
Interventions:  [x] Therapeutic Exercise  [x] Modalities:  [x] Therapeutic Activity     [] Ultrasound  [] Estim  [] Gait Training      [] Cervical Traction [] Lumbar Traction  [x] Neuromuscular Re-education    [x] Cold/hotpack [] Iontophoresis   [x] Instruction in HEP      [] Vasopneumatic   [x] Dry Needling    [x] Manual Therapy               [] Aquatic Therapy              Electronically signed by:  Navid Arroyo PTA  10/3/2023,12:42 PM      10/3/2023,5:56 PM

## 2023-10-05 ENCOUNTER — HOSPITAL ENCOUNTER (OUTPATIENT)
Dept: PHYSICAL THERAPY | Age: 53
Discharge: HOME | End: 2023-10-05

## 2023-10-05 NOTE — FLOWSHEET NOTE
Physical Therapy  Cancellation/No-show Note  Patient Name:  Sherlyn Jung  :  1970   Date:  10/5/2023  Cancelled visits to date: 1  No-shows to date: 3    For today's appointment patient:  []  Cancelled  []  Rescheduled appointment  [x]  No-show     Reason given by patient:  []  Patient ill  []  Conflicting appointment  []  No transportation    []  Conflict with work  [x]  No reason given  []  Other:     Comments:     Electronically signed by:  Serena López PTA           10/5/2023,2:18 PM

## 2023-10-10 ENCOUNTER — HOSPITAL ENCOUNTER (OUTPATIENT)
Dept: PHYSICAL THERAPY | Age: 53
Discharge: HOME | End: 2023-10-10

## 2023-10-10 NOTE — FLOWSHEET NOTE
Physical Therapy  Cancellation/No-show Note  Patient Name:  Jozef Jennings  :  1970   Date:  10/10/2023  Cancelled visits to date: 2  No-shows to date: 3    For today's appointment patient:  [x]  Cancelled  []  Rescheduled appointment  []  No-show     Reason given by patient:  []  Patient ill  []  Conflicting appointment  []  No transportation    []  Conflict with work  []  No reason given  [x]  Other:     Comments: Patient needed to cancel this, no transportation    Electronically signed by:  Dylan Bermudez, PT DPT        10/10/2023,1:15 PM

## 2023-10-12 ENCOUNTER — HOSPITAL ENCOUNTER (OUTPATIENT)
Dept: PHYSICAL THERAPY | Age: 53
Discharge: HOME | End: 2023-10-12

## 2023-10-12 NOTE — FLOWSHEET NOTE
Physical Therapy  Cancellation/No-show Note  Patient Name:  Byron Vazquez  :  1970   Date:  10/12/2023  Cancelled visits to date: 2  No-shows to date:4    For today's appointment patient:  []  Cancelled  []  Rescheduled appointment  [x]  No-show     Reason given by patient:  []  Patient ill  []  Conflicting appointment  []  No transportation    []  Conflict with work  []  No reason given  []  Other:     Comments:Electronically signed by:  Hai Juárez PTA        10/12/2023,5:02 PM      10/12/2023,5:02 PM

## 2024-12-31 LAB
ESTIMATED AVERAGE GLUCOSE: NORMAL
HBA1C MFR BLD: 5.6 %

## 2025-02-21 ENCOUNTER — TELEPHONE (OUTPATIENT)
Dept: FAMILY MEDICINE CLINIC | Age: 55
End: 2025-02-21

## 2025-03-25 ENCOUNTER — OFFICE VISIT (OUTPATIENT)
Dept: FAMILY MEDICINE CLINIC | Age: 55
End: 2025-03-25
Payer: MEDICARE

## 2025-03-25 VITALS
SYSTOLIC BLOOD PRESSURE: 114 MMHG | WEIGHT: 217 LBS | OXYGEN SATURATION: 99 % | BODY MASS INDEX: 36.15 KG/M2 | DIASTOLIC BLOOD PRESSURE: 82 MMHG | HEIGHT: 65 IN | HEART RATE: 86 BPM

## 2025-03-25 DIAGNOSIS — M54.81 BILATERAL OCCIPITAL NEURALGIA: ICD-10-CM

## 2025-03-25 DIAGNOSIS — G62.9 NEUROPATHY: Primary | ICD-10-CM

## 2025-03-25 DIAGNOSIS — J20.9 ACUTE BRONCHITIS, UNSPECIFIED ORGANISM: ICD-10-CM

## 2025-03-25 DIAGNOSIS — E78.2 MIXED HYPERLIPIDEMIA: ICD-10-CM

## 2025-03-25 DIAGNOSIS — E03.9 HYPOTHYROIDISM, UNSPECIFIED TYPE: ICD-10-CM

## 2025-03-25 DIAGNOSIS — Z12.31 SCREENING MAMMOGRAM FOR BREAST CANCER: ICD-10-CM

## 2025-03-25 DIAGNOSIS — R07.81 RIB PAIN ON RIGHT SIDE: ICD-10-CM

## 2025-03-25 DIAGNOSIS — J45.30 MILD PERSISTENT ASTHMA WITHOUT COMPLICATION: ICD-10-CM

## 2025-03-25 DIAGNOSIS — E55.9 VITAMIN D DEFICIENCY: ICD-10-CM

## 2025-03-25 DIAGNOSIS — F41.1 GAD (GENERALIZED ANXIETY DISORDER): ICD-10-CM

## 2025-03-25 DIAGNOSIS — M53.9 MULTILEVEL DEGENERATIVE DISC DISEASE: ICD-10-CM

## 2025-03-25 DIAGNOSIS — Z76.89 ENCOUNTER TO ESTABLISH CARE: ICD-10-CM

## 2025-03-25 DIAGNOSIS — M25.531 RIGHT WRIST PAIN: ICD-10-CM

## 2025-03-25 DIAGNOSIS — E66.01 MORBID (SEVERE) OBESITY DUE TO EXCESS CALORIES: Chronic | ICD-10-CM

## 2025-03-25 PROBLEM — R42 DIZZINESS: Status: RESOLVED | Noted: 2018-01-18 | Resolved: 2025-03-25

## 2025-03-25 PROBLEM — R73.03 PREDIABETES: Status: ACTIVE | Noted: 2023-02-21

## 2025-03-25 PROBLEM — Z91.09 ENVIRONMENTAL ALLERGIES: Status: ACTIVE | Noted: 2024-12-17

## 2025-03-25 PROBLEM — S76.011A: Status: RESOLVED | Noted: 2021-07-25 | Resolved: 2025-03-25

## 2025-03-25 PROBLEM — R07.9 CHEST PAIN WITH HIGH RISK OF ACUTE CORONARY SYNDROME: Status: RESOLVED | Noted: 2018-06-26 | Resolved: 2025-03-25

## 2025-03-25 PROBLEM — Z23 NEED FOR PROPHYLACTIC VACCINATION AGAINST STREPTOCOCCUS PNEUMONIAE (PNEUMOCOCCUS): Status: RESOLVED | Noted: 2018-11-02 | Resolved: 2025-03-25

## 2025-03-25 PROBLEM — R20.2 PARESTHESIA: Status: RESOLVED | Noted: 2025-03-25 | Resolved: 2025-03-25

## 2025-03-25 PROBLEM — R20.2 PARESTHESIA: Status: ACTIVE | Noted: 2025-03-25

## 2025-03-25 PROCEDURE — G2211 COMPLEX E/M VISIT ADD ON: HCPCS

## 2025-03-25 PROCEDURE — 99204 OFFICE O/P NEW MOD 45 MIN: CPT

## 2025-03-25 RX ORDER — ROSUVASTATIN CALCIUM 5 MG/1
5 TABLET, COATED ORAL DAILY
COMMUNITY
Start: 2025-01-21

## 2025-03-25 RX ORDER — CHOLECALCIFEROL (VITAMIN D3) 50 MCG
1 TABLET ORAL DAILY
COMMUNITY
Start: 2025-03-20

## 2025-03-25 RX ORDER — ALBUTEROL SULFATE 90 UG/1
2 INHALANT RESPIRATORY (INHALATION) EVERY 4 HOURS PRN
Qty: 18 G | Refills: 3 | Status: SHIPPED | OUTPATIENT
Start: 2025-03-25

## 2025-03-25 RX ORDER — SERTRALINE HYDROCHLORIDE 100 MG/1
100 TABLET, FILM COATED ORAL DAILY
COMMUNITY
Start: 2024-12-17

## 2025-03-25 RX ORDER — GABAPENTIN 800 MG/1
800 TABLET ORAL 3 TIMES DAILY
COMMUNITY
Start: 2025-03-24

## 2025-03-25 RX ORDER — METHOCARBAMOL 500 MG/1
500 TABLET, FILM COATED ORAL 2 TIMES DAILY PRN
COMMUNITY
Start: 2024-12-17

## 2025-03-25 ASSESSMENT — PATIENT HEALTH QUESTIONNAIRE - PHQ9
10. IF YOU CHECKED OFF ANY PROBLEMS, HOW DIFFICULT HAVE THESE PROBLEMS MADE IT FOR YOU TO DO YOUR WORK, TAKE CARE OF THINGS AT HOME, OR GET ALONG WITH OTHER PEOPLE: NOT DIFFICULT AT ALL
SUM OF ALL RESPONSES TO PHQ QUESTIONS 1-9: 4
4. FEELING TIRED OR HAVING LITTLE ENERGY: NEARLY EVERY DAY
1. LITTLE INTEREST OR PLEASURE IN DOING THINGS: NOT AT ALL
6. FEELING BAD ABOUT YOURSELF - OR THAT YOU ARE A FAILURE OR HAVE LET YOURSELF OR YOUR FAMILY DOWN: NOT AT ALL
SUM OF ALL RESPONSES TO PHQ QUESTIONS 1-9: 4
SUM OF ALL RESPONSES TO PHQ QUESTIONS 1-9: 4
8. MOVING OR SPEAKING SO SLOWLY THAT OTHER PEOPLE COULD HAVE NOTICED. OR THE OPPOSITE, BEING SO FIGETY OR RESTLESS THAT YOU HAVE BEEN MOVING AROUND A LOT MORE THAN USUAL: NOT AT ALL
2. FEELING DOWN, DEPRESSED OR HOPELESS: NOT AT ALL
SUM OF ALL RESPONSES TO PHQ QUESTIONS 1-9: 4
9. THOUGHTS THAT YOU WOULD BE BETTER OFF DEAD, OR OF HURTING YOURSELF: NOT AT ALL
3. TROUBLE FALLING OR STAYING ASLEEP: SEVERAL DAYS
5. POOR APPETITE OR OVEREATING: NOT AT ALL
7. TROUBLE CONCENTRATING ON THINGS, SUCH AS READING THE NEWSPAPER OR WATCHING TELEVISION: NOT AT ALL

## 2025-03-25 ASSESSMENT — ENCOUNTER SYMPTOMS
WHEEZING: 0
ABDOMINAL PAIN: 0
COLOR CHANGE: 0
COUGH: 1
SHORTNESS OF BREATH: 0

## 2025-03-25 NOTE — ASSESSMENT & PLAN NOTE
Previously on Adipex, discontinued 12/2024 due to failure to lose 5% weight over 3 mo.  She would like to resume after 6 months, approximately June 2025.  Further discuss at next visit. Work on diet/exercise

## 2025-03-25 NOTE — PROGRESS NOTES
Yessy Adkins PA-C  [phone]    Dona Amado  [id number]  54 y.o.  1970    Chief Complaint:  Chief Complaint   Patient presents with    New Patient    Hand Pain     Thumb gets numb right. Goes up to elbow. constant    Flank Pain     Right flank x2 weeks       HPI:  Dona is a 54 y.o. female, former patient of Yessy Adkins PA-C, who presents today for evaluation and management of hand pain and right-sided rib pain s/p coughing with bronchitis x 1 mo ago.    Assessment and Medical Decision Makin. Neuropathy  Assessment & Plan:  Hx right wrist surgery and EMG. Reports worsening paresthesias of right distal upper extremity, radial distribution (1st 3 digits). No known injury. Continues on gabapentin. Has pre-existing labs from [place] to get drawn. Will be seeing orthoDr Santana. Follow up 3 mo  2. Right wrist pain  Assessment & Plan:  Hx right wrist surgery. Recurrent paresthesias radial digits right hand. She will follow up ortho   3. Rib pain on right side  Comments:  Right-sided rib pain s/p coughing w/ bronchitis x 1 mo ago. Continue methocarbamol and check right rib x-ray w/ PA chest.  Orders:  -     XR RIBS RIGHT INCLUDE CHEST (MIN 3 VIEWS); Future  4. Mild persistent asthma without complication  Assessment & Plan:  Recovering from recent bronchitis. Continue albuterol prn   Orders:  -     albuterol sulfate HFA (VENTOLIN HFA) 108 (90 Base) MCG/ACT inhaler; Inhale 2 puffs into the lungs every 4 hours as needed for Wheezing, Disp-18 g, R-3Normal  5. Multilevel degenerative disc disease  Assessment & Plan:   Chronic, at goal (stable), continue current treatment plan  6. Hypothyroidism, unspecified type  Assessment & Plan:   Chronic, at goal (stable), continue current treatment plan. Has labs to get drawn before next visit in 3 mo  7. Bilateral occipital neuralgia  Assessment & Plan:  On gabapentin. Follow up neuro. She states she plans to transfer her neuro care from [facility] to

## 2025-03-25 NOTE — ASSESSMENT & PLAN NOTE
Hx right wrist surgery and EMG. Reports worsening paresthesias of right distal upper extremity, radial distribution (1st 3 digits). No known injury. Continues on gabapentin. Has pre-existing labs from [place] to get drawn. Will be seeing ortho, Dr Santana. Follow up 3 mo

## 2025-03-26 NOTE — ASSESSMENT & PLAN NOTE
Hx right wrist surgery. Recurrent paresthesias radial digits right hand. She will follow up ortho

## 2025-03-26 NOTE — ASSESSMENT & PLAN NOTE
On gabapentin. Follow up neuro. She states she plans to transfer her neuro care from [facility] to [facility] due to proximity

## 2025-03-26 NOTE — ASSESSMENT & PLAN NOTE
Chronic, at goal (stable), continue current treatment plan. Has labs to get drawn before next visit in 3 mo

## 2025-04-04 ENCOUNTER — COMMUNITY OUTREACH (OUTPATIENT)
Dept: FAMILY MEDICINE CLINIC | Age: 55
End: 2025-04-04

## 2025-04-04 NOTE — PROGRESS NOTES
Care Everywhere audit shows patient had a Hemoglobin A1C done. Health maintenance has been updated.

## 2025-04-11 LAB — MAMMOGRAPHY, EXTERNAL: NEGATIVE

## 2025-06-25 ENCOUNTER — OFFICE VISIT (OUTPATIENT)
Dept: FAMILY MEDICINE CLINIC | Age: 55
End: 2025-06-25
Payer: MEDICARE

## 2025-06-25 VITALS
HEART RATE: 78 BPM | HEIGHT: 65 IN | WEIGHT: 222.3 LBS | BODY MASS INDEX: 37.04 KG/M2 | SYSTOLIC BLOOD PRESSURE: 120 MMHG | OXYGEN SATURATION: 97 % | DIASTOLIC BLOOD PRESSURE: 82 MMHG

## 2025-06-25 DIAGNOSIS — R53.83 OTHER FATIGUE: ICD-10-CM

## 2025-06-25 DIAGNOSIS — Z11.59 NEED FOR HEPATITIS C SCREENING TEST: ICD-10-CM

## 2025-06-25 DIAGNOSIS — M79.672 PAIN OF LEFT HEEL: ICD-10-CM

## 2025-06-25 DIAGNOSIS — M53.9 MULTILEVEL DEGENERATIVE DISC DISEASE: ICD-10-CM

## 2025-06-25 DIAGNOSIS — E66.01 MORBID (SEVERE) OBESITY DUE TO EXCESS CALORIES (HCC): Chronic | ICD-10-CM

## 2025-06-25 DIAGNOSIS — Z87.81 HISTORY OF FRACTURE OF LEFT ANKLE: Primary | ICD-10-CM

## 2025-06-25 DIAGNOSIS — R11.0 NAUSEA: ICD-10-CM

## 2025-06-25 DIAGNOSIS — E03.9 HYPOTHYROIDISM, UNSPECIFIED TYPE: ICD-10-CM

## 2025-06-25 PROCEDURE — 99214 OFFICE O/P EST MOD 30 MIN: CPT

## 2025-06-25 RX ORDER — PHENTERMINE HYDROCHLORIDE 37.5 MG/1
37.5 TABLET ORAL
Qty: 30 TABLET | Refills: 0 | Status: SHIPPED | OUTPATIENT
Start: 2025-06-25 | End: 2025-06-25

## 2025-06-25 RX ORDER — METHOCARBAMOL 500 MG/1
500 TABLET, FILM COATED ORAL 3 TIMES DAILY PRN
Qty: 90 TABLET | Refills: 2 | Status: SHIPPED | OUTPATIENT
Start: 2025-06-25

## 2025-06-25 RX ORDER — PROMETHAZINE HYDROCHLORIDE 25 MG/1
25 TABLET ORAL 4 TIMES DAILY PRN
Qty: 20 TABLET | Refills: 1 | Status: SHIPPED | OUTPATIENT
Start: 2025-06-25

## 2025-06-25 RX ORDER — PHENTERMINE HYDROCHLORIDE 37.5 MG/1
37.5 TABLET ORAL
Qty: 30 TABLET | Refills: 0 | Status: SHIPPED | OUTPATIENT
Start: 2025-06-25 | End: 2025-07-25

## 2025-06-25 NOTE — PATIENT INSTRUCTIONS
Weight loss clinics:    [facility]  [address], [place] [address] [phone]    [facility], Weight Loss, IV Hydration  [address], [place] [address] [phone]

## 2025-06-25 NOTE — PROGRESS NOTES
T4, Free; Future  8. Need for hepatitis C screening test  -     Hepatitis C Antibody; Future       Patient Active Problem List   Diagnosis    Mixed hyperlipidemia    Hypothyroidism    Uncomplicated asthma    Allergy to bee sting    Breast lump    Gastroesophageal reflux disease    Recurrent major depressive disorder, in partial remission    Postmenopausal HRT (hormone replacement therapy)    Meralgia paraesthetica, left    Vitamin D deficiency    URSULA (generalized anxiety disorder)    Complicated migraine    S/P hysterectomy with oophorectomy    Trochanteric bursitis of both hips    Right groin pain    Right wrist pain    Prediabetes    Morbid (severe) obesity due to excess calories (HCC)    Multilevel degenerative disc disease    Neuropathy    Environmental allergies    Bilateral occipital neuralgia       Social History:  Social History     Socioeconomic History    Marital status: Single     Spouse name: Not on file    Number of children: Not on file    Years of education: Not on file    Highest education level: Not on file   Occupational History    Occupation: Disability     Comment: Seizures   Tobacco Use    Smoking status: Former     Current packs/day: 0.00     Average packs/day: 2.0 packs/day for 5.0 years (10.0 ttl pk-yrs)     Types: Cigarettes     Start date: 10/21/1988     Quit date: 10/21/1993     Years since quittin.7    Smokeless tobacco: Never   Vaping Use    Vaping status: Never Used   Substance and Sexual Activity    Alcohol use: No    Drug use: No    Sexual activity: Yes     Partners: Male     Comment: , 1 child   Other Topics Concern    Not on file   Social History Narrative    Not on file     Social Drivers of Health     Financial Resource Strain: Not on file   Food Insecurity: Not on file   Transportation Needs: Not on file   Physical Activity: Not on file   Stress: Not on file   Social Connections: Not on file   Intimate Partner Violence: Not on file   Housing Stability: Not on file

## 2025-07-21 ENCOUNTER — COMMUNITY OUTREACH (OUTPATIENT)
Dept: FAMILY MEDICINE CLINIC | Age: 55
End: 2025-07-21

## 2025-07-24 NOTE — PROGRESS NOTES
Yessy Adkins PA-C  [phone]    Dona Amado  [id number]  54 y.o.  1970    Chief Complaint:  Chief Complaint   Patient presents with    1 Month Follow-Up     4 week follow up on Adipex   [facility]in sent [facility]- [facility]     Medicare AWV     AWV       HPI:  Dona is a 54 y.o. female, patient of Yessy Adkins PA-C, who presents today for AWV. Also for evaluation and management of obesity.    Obesity - doesn't look like she picked up adipex, [facility] didn't have Rx.    Can't go to [facility] (Dr Ramos). Refer to dr Acevedo    Hx narcolepsy    Has cut back on pop    Assessment and Medical Decision Makin. Morbid (severe) obesity due to excess calories (HCC)  -     phentermine (ADIPEX-P) 37.5 MG tablet; Take 1 tablet by mouth every morning (before breakfast) for 30 days. Max Daily Amount: 37.5 mg, Disp-30 tablet, R-0Normal  2. Multilevel degenerative disc disease  -     methocarbamol (ROBAXIN) 750 MG tablet; Take 1 tablet by mouth 3 times daily as needed (muscle cramping / pain), Disp-90 tablet, R-5Normal  3. Mixed hyperlipidemia  -     rosuvastatin (CRESTOR) 5 MG tablet; Take 1 tablet by mouth daily, Disp-30 tablet, R-5Normal  4. Nausea  -     promethazine (PHENERGAN) 25 MG tablet; Take 1 tablet by mouth 4 times daily as needed for Nausea, Disp-20 tablet, R-1Normal  5. Neuropathy  6. History of fracture of left ankle  -     External Referral To Podiatry  7. Pain of left heel  -     External Referral To Podiatry  8. Daytime sleepiness  -     [facility]  9. Welcome to Medicare preventive visit       Patient Active Problem List   Diagnosis    Mixed hyperlipidemia    Hypothyroidism    Uncomplicated asthma    Allergy to bee sting    Breast lump    Gastroesophageal reflux disease    Recurrent major depressive disorder, in partial remission    Postmenopausal HRT (hormone replacement therapy)    Meralgia paraesthetica, left    Vitamin D deficiency    URSULA (generalized anxiety

## 2025-07-25 ENCOUNTER — OFFICE VISIT (OUTPATIENT)
Dept: FAMILY MEDICINE CLINIC | Age: 55
End: 2025-07-25
Payer: MEDICARE

## 2025-07-25 VITALS
WEIGHT: 217.9 LBS | DIASTOLIC BLOOD PRESSURE: 78 MMHG | BODY MASS INDEX: 36.3 KG/M2 | HEIGHT: 65 IN | HEART RATE: 75 BPM | SYSTOLIC BLOOD PRESSURE: 116 MMHG | OXYGEN SATURATION: 98 %

## 2025-07-25 DIAGNOSIS — Z00.00 WELCOME TO MEDICARE PREVENTIVE VISIT: ICD-10-CM

## 2025-07-25 DIAGNOSIS — Z87.81 HISTORY OF FRACTURE OF LEFT ANKLE: ICD-10-CM

## 2025-07-25 DIAGNOSIS — R11.0 NAUSEA: ICD-10-CM

## 2025-07-25 DIAGNOSIS — G62.9 NEUROPATHY: ICD-10-CM

## 2025-07-25 DIAGNOSIS — M53.9 MULTILEVEL DEGENERATIVE DISC DISEASE: ICD-10-CM

## 2025-07-25 DIAGNOSIS — M79.672 PAIN OF LEFT HEEL: ICD-10-CM

## 2025-07-25 DIAGNOSIS — E78.2 MIXED HYPERLIPIDEMIA: ICD-10-CM

## 2025-07-25 DIAGNOSIS — R40.0 DAYTIME SLEEPINESS: ICD-10-CM

## 2025-07-25 DIAGNOSIS — E66.01 MORBID (SEVERE) OBESITY DUE TO EXCESS CALORIES (HCC): Primary | Chronic | ICD-10-CM

## 2025-07-25 PROCEDURE — G0402 INITIAL PREVENTIVE EXAM: HCPCS

## 2025-07-25 PROCEDURE — 99214 OFFICE O/P EST MOD 30 MIN: CPT

## 2025-07-25 RX ORDER — PHENTERMINE HYDROCHLORIDE 37.5 MG/1
37.5 TABLET ORAL
Qty: 30 TABLET | Refills: 0 | Status: SHIPPED | OUTPATIENT
Start: 2025-07-25 | End: 2025-08-24

## 2025-07-25 RX ORDER — METHOCARBAMOL 750 MG/1
750 TABLET, FILM COATED ORAL 3 TIMES DAILY PRN
Qty: 90 TABLET | Refills: 5 | Status: SHIPPED | OUTPATIENT
Start: 2025-07-25

## 2025-07-25 RX ORDER — PROMETHAZINE HYDROCHLORIDE 25 MG/1
25 TABLET ORAL 4 TIMES DAILY PRN
Qty: 20 TABLET | Refills: 1 | Status: SHIPPED | OUTPATIENT
Start: 2025-07-25

## 2025-07-25 RX ORDER — ROSUVASTATIN CALCIUM 5 MG/1
5 TABLET, COATED ORAL DAILY
Qty: 30 TABLET | Refills: 5 | Status: SHIPPED | OUTPATIENT
Start: 2025-07-25

## 2025-07-25 ASSESSMENT — PATIENT HEALTH QUESTIONNAIRE - PHQ9
2. FEELING DOWN, DEPRESSED OR HOPELESS: NOT AT ALL
SUM OF ALL RESPONSES TO PHQ QUESTIONS 1-9: 1
SUM OF ALL RESPONSES TO PHQ QUESTIONS 1-9: 1
4. FEELING TIRED OR HAVING LITTLE ENERGY: SEVERAL DAYS
3. TROUBLE FALLING OR STAYING ASLEEP: NOT AT ALL
7. TROUBLE CONCENTRATING ON THINGS, SUCH AS READING THE NEWSPAPER OR WATCHING TELEVISION: NOT AT ALL
1. LITTLE INTEREST OR PLEASURE IN DOING THINGS: NOT AT ALL
9. THOUGHTS THAT YOU WOULD BE BETTER OFF DEAD, OR OF HURTING YOURSELF: NOT AT ALL
5. POOR APPETITE OR OVEREATING: NOT AT ALL
10. IF YOU CHECKED OFF ANY PROBLEMS, HOW DIFFICULT HAVE THESE PROBLEMS MADE IT FOR YOU TO DO YOUR WORK, TAKE CARE OF THINGS AT HOME, OR GET ALONG WITH OTHER PEOPLE: NOT DIFFICULT AT ALL
SUM OF ALL RESPONSES TO PHQ QUESTIONS 1-9: 1
6. FEELING BAD ABOUT YOURSELF - OR THAT YOU ARE A FAILURE OR HAVE LET YOURSELF OR YOUR FAMILY DOWN: NOT AT ALL
8. MOVING OR SPEAKING SO SLOWLY THAT OTHER PEOPLE COULD HAVE NOTICED. OR THE OPPOSITE, BEING SO FIGETY OR RESTLESS THAT YOU HAVE BEEN MOVING AROUND A LOT MORE THAN USUAL: NOT AT ALL
SUM OF ALL RESPONSES TO PHQ QUESTIONS 1-9: 1

## 2025-07-25 ASSESSMENT — LIFESTYLE VARIABLES
HOW MANY STANDARD DRINKS CONTAINING ALCOHOL DO YOU HAVE ON A TYPICAL DAY: PATIENT DOES NOT DRINK
HOW OFTEN DO YOU HAVE A DRINK CONTAINING ALCOHOL: NEVER

## 2025-07-30 ASSESSMENT — ENCOUNTER SYMPTOMS
ABDOMINAL PAIN: 0
SHORTNESS OF BREATH: 0

## 2025-07-31 NOTE — PROGRESS NOTES
Medicare Annual Wellness Visit    Dona Amado is here for 1 Month Follow-Up (4 week follow up on Adipex /Robaxin sent [facility]- much cheaper ) and Medicare AWV (AWV)    Assessment & Plan   Morbid (severe) obesity due to excess calories (HCC)  -     phentermine (ADIPEX-P) 37.5 MG tablet; Take 1 tablet by mouth every morning (before breakfast) for 30 days. Max Daily Amount: 37.5 mg, Disp-30 tablet, R-0Normal  Multilevel degenerative disc disease  -     methocarbamol (ROBAXIN) 750 MG tablet; Take 1 tablet by mouth 3 times daily as needed (muscle cramping / pain), Disp-90 tablet, R-5Normal  Mixed hyperlipidemia  -     rosuvastatin (CRESTOR) 5 MG tablet; Take 1 tablet by mouth daily, Disp-30 tablet, R-5Normal  Nausea  -     promethazine (PHENERGAN) 25 MG tablet; Take 1 tablet by mouth 4 times daily as needed for Nausea, Disp-20 tablet, R-1Normal  Neuropathy  History of fracture of left ankle  -     External Referral To Podiatry  Pain of left heel  -     External Referral To Podiatry  Daytime sleepiness  -     [facility]  Welcome to Medicare preventive visit       Return for 1 mo weight check.     Subjective       Patient's complete Health Risk Assessment and screening values have been reviewed and are found in Flowsheets. The following problems were reviewed today and where indicated follow up appointments were made and/or referrals ordered.    Positive Risk Factor Screenings with Interventions:                Abnormal BMI (obese):  Body mass index is 36.26 kg/m². (!) Abnormal  Interventions:  low carbohydrate diet, exercise for at least 150 minutes/week, Adipex           Safety:  Do you have non-slip mats or non-slip surfaces or shower bars or grab bars in your shower or bathtub?: (!) No  Interventions:  Encourage safety precautions     ADL's:   Patient reports needing help with:  Select all that apply: (!) Transportation (doesn't have drivers liscense yet)  Interventions:  Patient advised

## 2025-07-31 NOTE — PATIENT INSTRUCTIONS
yourself. This person is called a health care agent (health care proxy, health care surrogate). The form is also called a durable power of  for health care.  If you do not have an advance directive, decisions about your medical care may be made by a family member or doctor who doesn't know you or by a .  It may help to think of an advance directive as a gift to the people who care for you. If you have one, they won't have to make tough decisions by themselves.  For more information, including forms for your state, see the [employer] website (www.caringinfo.org/planning/advance-directives/).  Follow-up care is a key part of your treatment and safety. Be sure to make and go to all appointments, and call your doctor if you are having problems. It's also a good idea to know your test results and keep a list of the medicines you take.  What should you include in an advance directive?  Many states have a unique advance directive form. (It may ask you to address specific issues.) Or you might use a universal form that's approved by many states.  If your form doesn't tell you what to address, it may be hard to know what to include in your advance directive. Use the questions below to help you get started.  Who do you want to make decisions about your medical care if you are not able to?  What life-support measures do you want if you have a serious illness that gets worse over time or can't be cured?  What are you most afraid of that might happen? (Maybe you're afraid of having pain, losing your independence, or being kept alive by machines.)  Where would you prefer to die? (Your home? A hospital? A nursing home?)  Do you want to donate your organs when you die?  Do you want certain [facility] practices performed before you die?  When should you call for help?  Be sure to contact your doctor if you have any questions.  Where can you learn more?  Go to https://www.healthwise.net/patientEd and enter R264 to learn

## 2025-09-02 ENCOUNTER — OFFICE VISIT (OUTPATIENT)
Dept: FAMILY MEDICINE CLINIC | Age: 55
End: 2025-09-02
Payer: MEDICARE

## 2025-09-02 VITALS
HEIGHT: 65 IN | OXYGEN SATURATION: 98 % | HEART RATE: 66 BPM | SYSTOLIC BLOOD PRESSURE: 110 MMHG | BODY MASS INDEX: 35.7 KG/M2 | WEIGHT: 214.3 LBS | DIASTOLIC BLOOD PRESSURE: 76 MMHG

## 2025-09-02 DIAGNOSIS — E03.9 HYPOTHYROIDISM, UNSPECIFIED TYPE: ICD-10-CM

## 2025-09-02 DIAGNOSIS — E66.01 MORBID (SEVERE) OBESITY DUE TO EXCESS CALORIES (HCC): Primary | Chronic | ICD-10-CM

## 2025-09-02 DIAGNOSIS — G62.9 NEUROPATHY: ICD-10-CM

## 2025-09-02 PROCEDURE — 99214 OFFICE O/P EST MOD 30 MIN: CPT

## 2025-09-02 RX ORDER — GABAPENTIN 800 MG/1
800 TABLET ORAL 3 TIMES DAILY
Qty: 270 TABLET | Refills: 1 | Status: SHIPPED | OUTPATIENT
Start: 2025-09-02 | End: 2026-03-01

## 2025-09-02 RX ORDER — LEVOTHYROXINE SODIUM 75 UG/1
75 TABLET ORAL DAILY
Qty: 90 TABLET | Refills: 1 | Status: SHIPPED | OUTPATIENT
Start: 2025-09-02

## 2025-09-02 RX ORDER — PHENTERMINE HYDROCHLORIDE 37.5 MG/1
37.5 TABLET ORAL
Qty: 30 TABLET | Refills: 0 | Status: SHIPPED | OUTPATIENT
Start: 2025-09-02 | End: 2025-10-02

## 2025-09-02 ASSESSMENT — ENCOUNTER SYMPTOMS
SHORTNESS OF BREATH: 0
ABDOMINAL PAIN: 0

## 2025-09-03 ENCOUNTER — RESULTS FOLLOW-UP (OUTPATIENT)
Dept: FAMILY MEDICINE CLINIC | Age: 55
End: 2025-09-03